# Patient Record
Sex: MALE
[De-identification: names, ages, dates, MRNs, and addresses within clinical notes are randomized per-mention and may not be internally consistent; named-entity substitution may affect disease eponyms.]

---

## 2017-03-20 ENCOUNTER — APPOINTMENT (OUTPATIENT)
Dept: ENDOCRINOLOGY | Facility: CLINIC | Age: 57
End: 2017-03-20

## 2017-03-20 VITALS
HEART RATE: 71 BPM | SYSTOLIC BLOOD PRESSURE: 132 MMHG | BODY MASS INDEX: 33.18 KG/M2 | WEIGHT: 204 LBS | OXYGEN SATURATION: 97 % | DIASTOLIC BLOOD PRESSURE: 90 MMHG | HEIGHT: 65.75 IN

## 2017-03-20 DIAGNOSIS — Z83.49 FAMILY HISTORY OF OTHER ENDOCRINE, NUTRITIONAL AND METABOLIC DISEASES: ICD-10-CM

## 2017-03-20 DIAGNOSIS — Z78.9 OTHER SPECIFIED HEALTH STATUS: ICD-10-CM

## 2017-03-20 DIAGNOSIS — Z00.00 ENCOUNTER FOR GENERAL ADULT MEDICAL EXAMINATION W/OUT ABNORMAL FINDINGS: ICD-10-CM

## 2017-03-20 LAB
GLUCOSE BLDC GLUCOMTR-MCNC: 269
HBA1C MFR BLD HPLC: 10.7

## 2017-03-20 RX ORDER — ISOPROPYL ALCOHOL 70 ML/100ML
SWAB TOPICAL
Qty: 100 | Refills: 5 | Status: ACTIVE | COMMUNITY
Start: 2017-03-20 | End: 1900-01-01

## 2017-03-22 LAB
25(OH)D3 SERPL-MCNC: 15.1 NG/ML
ANION GAP SERPL CALC-SCNC: 19 MMOL/L
BUN SERPL-MCNC: 23 MG/DL
CALCIUM SERPL-MCNC: 9.6 MG/DL
CHLORIDE SERPL-SCNC: 94 MMOL/L
CHOLEST SERPL-MCNC: 275 MG/DL
CHOLEST/HDLC SERPL: 8.9 RATIO
CO2 SERPL-SCNC: 22 MMOL/L
CREAT SERPL-MCNC: 0.88 MG/DL
CREAT SPEC-SCNC: 128 MG/DL
GLUCOSE SERPL-MCNC: 253 MG/DL
HBA1C MFR BLD HPLC: 10.6 %
HDLC SERPL-MCNC: 31 MG/DL
LDLC SERPL CALC-MCNC: NORMAL MG/DL
MICROALBUMIN 24H UR DL<=1MG/L-MCNC: 6.1 MG/DL
MICROALBUMIN/CREAT 24H UR-RTO: 48 UG/MG
POTASSIUM SERPL-SCNC: 4.2 MMOL/L
SODIUM SERPL-SCNC: 135 MMOL/L
TRIGL SERPL-MCNC: 699 MG/DL
TSH SERPL-ACNC: 2.6 UIU/ML

## 2017-05-02 ENCOUNTER — APPOINTMENT (OUTPATIENT)
Dept: ENDOCRINOLOGY | Facility: CLINIC | Age: 57
End: 2017-05-02

## 2017-07-10 ENCOUNTER — APPOINTMENT (OUTPATIENT)
Dept: ENDOCRINOLOGY | Facility: CLINIC | Age: 57
End: 2017-07-10

## 2017-07-19 ENCOUNTER — RX RENEWAL (OUTPATIENT)
Age: 57
End: 2017-07-19

## 2017-11-03 ENCOUNTER — RX RENEWAL (OUTPATIENT)
Age: 57
End: 2017-11-03

## 2018-04-03 ENCOUNTER — RX RENEWAL (OUTPATIENT)
Age: 58
End: 2018-04-03

## 2018-07-05 ENCOUNTER — RX RENEWAL (OUTPATIENT)
Age: 58
End: 2018-07-05

## 2018-07-20 ENCOUNTER — RX RENEWAL (OUTPATIENT)
Age: 58
End: 2018-07-20

## 2018-08-13 ENCOUNTER — RX RENEWAL (OUTPATIENT)
Age: 58
End: 2018-08-13

## 2018-08-13 ENCOUNTER — APPOINTMENT (OUTPATIENT)
Dept: ENDOCRINOLOGY | Facility: CLINIC | Age: 58
End: 2018-08-13
Payer: COMMERCIAL

## 2018-08-13 VITALS
WEIGHT: 200 LBS | BODY MASS INDEX: 32.53 KG/M2 | HEART RATE: 67 BPM | SYSTOLIC BLOOD PRESSURE: 120 MMHG | HEIGHT: 65.75 IN | DIASTOLIC BLOOD PRESSURE: 80 MMHG | OXYGEN SATURATION: 97 %

## 2018-08-13 LAB
GLUCOSE BLDC GLUCOMTR-MCNC: 221
HBA1C MFR BLD HPLC: 10.8

## 2018-08-13 PROCEDURE — 82962 GLUCOSE BLOOD TEST: CPT

## 2018-08-13 PROCEDURE — 99215 OFFICE O/P EST HI 40 MIN: CPT | Mod: 25

## 2018-08-13 PROCEDURE — 83036 HEMOGLOBIN GLYCOSYLATED A1C: CPT | Mod: QW

## 2018-08-14 ENCOUNTER — RESULT REVIEW (OUTPATIENT)
Age: 58
End: 2018-08-14

## 2018-08-14 LAB
25(OH)D3 SERPL-MCNC: 26.6 NG/ML
ALBUMIN SERPL ELPH-MCNC: 4.5 G/DL
ALP BLD-CCNC: 124 U/L
ALT SERPL-CCNC: 29 U/L
ANION GAP SERPL CALC-SCNC: 18 MMOL/L
AST SERPL-CCNC: 17 U/L
BILIRUB SERPL-MCNC: 0.4 MG/DL
BUN SERPL-MCNC: 16 MG/DL
CALCIUM SERPL-MCNC: 9.6 MG/DL
CHLORIDE SERPL-SCNC: 95 MMOL/L
CHOLEST SERPL-MCNC: 141 MG/DL
CHOLEST/HDLC SERPL: 3.8 RATIO
CO2 SERPL-SCNC: 25 MMOL/L
CREAT SERPL-MCNC: 0.8 MG/DL
CREAT SPEC-SCNC: 97 MG/DL
GLUCOSE SERPL-MCNC: 225 MG/DL
HDLC SERPL-MCNC: 37 MG/DL
LDLC SERPL CALC-MCNC: 47 MG/DL
MICROALBUMIN 24H UR DL<=1MG/L-MCNC: 3.8 MG/DL
MICROALBUMIN/CREAT 24H UR-RTO: 39 MG/G
POTASSIUM SERPL-SCNC: 4.7 MMOL/L
PROT SERPL-MCNC: 7.1 G/DL
SODIUM SERPL-SCNC: 138 MMOL/L
TRIGL SERPL-MCNC: 286 MG/DL

## 2018-08-14 RX ORDER — ERGOCALCIFEROL 1.25 MG/1
1.25 MG CAPSULE, LIQUID FILLED ORAL
Qty: 12 | Refills: 0 | Status: DISCONTINUED | COMMUNITY
Start: 2017-03-21 | End: 2018-08-14

## 2018-09-05 ENCOUNTER — RX RENEWAL (OUTPATIENT)
Age: 58
End: 2018-09-05

## 2018-11-19 ENCOUNTER — APPOINTMENT (OUTPATIENT)
Dept: ENDOCRINOLOGY | Facility: CLINIC | Age: 58
End: 2018-11-19

## 2019-01-05 ENCOUNTER — RX RENEWAL (OUTPATIENT)
Age: 59
End: 2019-01-05

## 2019-03-25 ENCOUNTER — RX RENEWAL (OUTPATIENT)
Age: 59
End: 2019-03-25

## 2019-06-14 ENCOUNTER — RX RENEWAL (OUTPATIENT)
Age: 59
End: 2019-06-14

## 2019-07-26 ENCOUNTER — RX RENEWAL (OUTPATIENT)
Age: 59
End: 2019-07-26

## 2019-08-20 ENCOUNTER — RX CHANGE (OUTPATIENT)
Age: 59
End: 2019-08-20

## 2019-08-29 ENCOUNTER — RX RENEWAL (OUTPATIENT)
Age: 59
End: 2019-08-29

## 2020-01-24 ENCOUNTER — RX RENEWAL (OUTPATIENT)
Age: 60
End: 2020-01-24

## 2020-02-24 ENCOUNTER — RX RENEWAL (OUTPATIENT)
Age: 60
End: 2020-02-24

## 2020-08-07 ENCOUNTER — RX RENEWAL (OUTPATIENT)
Age: 60
End: 2020-08-07

## 2020-10-14 ENCOUNTER — RX RENEWAL (OUTPATIENT)
Age: 60
End: 2020-10-14

## 2021-01-05 ENCOUNTER — APPOINTMENT (OUTPATIENT)
Dept: ENDOCRINOLOGY | Facility: CLINIC | Age: 61
End: 2021-01-05

## 2021-04-12 ENCOUNTER — RX RENEWAL (OUTPATIENT)
Age: 61
End: 2021-04-12

## 2021-10-18 ENCOUNTER — RX RENEWAL (OUTPATIENT)
Age: 61
End: 2021-10-18

## 2021-10-20 ENCOUNTER — RX RENEWAL (OUTPATIENT)
Age: 61
End: 2021-10-20

## 2021-11-01 ENCOUNTER — RX RENEWAL (OUTPATIENT)
Age: 61
End: 2021-11-01

## 2021-12-28 ENCOUNTER — RX RENEWAL (OUTPATIENT)
Age: 61
End: 2021-12-28

## 2022-03-23 ENCOUNTER — RX RENEWAL (OUTPATIENT)
Age: 62
End: 2022-03-23

## 2022-06-28 ENCOUNTER — RX RENEWAL (OUTPATIENT)
Age: 62
End: 2022-06-28

## 2022-07-13 ENCOUNTER — APPOINTMENT (OUTPATIENT)
Dept: ENDOCRINOLOGY | Facility: CLINIC | Age: 62
End: 2022-07-13

## 2022-07-13 VITALS — BODY MASS INDEX: 29.03 KG/M2 | WEIGHT: 185 LBS | HEIGHT: 67 IN

## 2022-07-13 DIAGNOSIS — E11.9 TYPE 2 DIABETES MELLITUS W/OUT COMPLICATIONS: ICD-10-CM

## 2022-07-13 LAB — GLUCOSE BLDC GLUCOMTR-MCNC: 243

## 2022-07-13 PROCEDURE — 82962 GLUCOSE BLOOD TEST: CPT

## 2022-07-13 PROCEDURE — G0108 DIAB MANAGE TRN  PER INDIV: CPT

## 2022-07-13 RX ORDER — BLOOD SUGAR DIAGNOSTIC
STRIP MISCELLANEOUS
Qty: 50 | Refills: 5 | Status: ACTIVE | COMMUNITY
Start: 2017-03-20 | End: 1900-01-01

## 2022-08-23 ENCOUNTER — NON-APPOINTMENT (OUTPATIENT)
Age: 62
End: 2022-08-23

## 2022-09-13 ENCOUNTER — RX RENEWAL (OUTPATIENT)
Age: 62
End: 2022-09-13

## 2022-09-13 RX ORDER — LANCETS
EACH MISCELLANEOUS
Qty: 200 | Refills: 3 | Status: ACTIVE | COMMUNITY
Start: 2017-03-20 | End: 1900-01-01

## 2022-09-20 ENCOUNTER — RX RENEWAL (OUTPATIENT)
Age: 62
End: 2022-09-20

## 2022-09-23 ENCOUNTER — APPOINTMENT (OUTPATIENT)
Dept: ENDOCRINOLOGY | Facility: CLINIC | Age: 62
End: 2022-09-23

## 2022-09-23 VITALS
HEART RATE: 87 BPM | DIASTOLIC BLOOD PRESSURE: 84 MMHG | BODY MASS INDEX: 29.98 KG/M2 | WEIGHT: 191 LBS | SYSTOLIC BLOOD PRESSURE: 140 MMHG | OXYGEN SATURATION: 97 % | HEIGHT: 67 IN | TEMPERATURE: 98.1 F

## 2022-09-23 DIAGNOSIS — E11.65 TYPE 2 DIABETES MELLITUS WITH HYPERGLYCEMIA: ICD-10-CM

## 2022-09-23 DIAGNOSIS — I10 ESSENTIAL (PRIMARY) HYPERTENSION: ICD-10-CM

## 2022-09-23 DIAGNOSIS — E78.5 HYPERLIPIDEMIA, UNSPECIFIED: ICD-10-CM

## 2022-09-23 DIAGNOSIS — R79.89 OTHER SPECIFIED ABNORMAL FINDINGS OF BLOOD CHEMISTRY: ICD-10-CM

## 2022-09-23 LAB
GLUCOSE BLDC GLUCOMTR-MCNC: 172
HBA1C MFR BLD HPLC: 7.9

## 2022-09-23 PROCEDURE — 83036 HEMOGLOBIN GLYCOSYLATED A1C: CPT | Mod: QW

## 2022-09-23 PROCEDURE — 82962 GLUCOSE BLOOD TEST: CPT

## 2022-09-23 PROCEDURE — 99215 OFFICE O/P EST HI 40 MIN: CPT | Mod: 25

## 2022-09-23 RX ORDER — MULTIVIT-MIN/FOLIC/VIT K/LYCOP 400-300MCG
25 MCG TABLET ORAL DAILY
Qty: 90 | Refills: 3 | Status: ACTIVE | COMMUNITY
Start: 2018-08-14 | End: 1900-01-01

## 2022-09-23 NOTE — ASSESSMENT
[Diabetes Foot Care] : diabetes foot care [Importance of Diet and Exercise] : importance of diet and exercise to improve glycemic control, achieve weight loss and improve cardiovascular health [Exercise/Effect on Glucose] : exercise/effect on glucose [Hypoglycemia Management] : hypoglycemia management [Self Monitoring of Blood Glucose] : self monitoring of blood glucose [FreeTextEntry1] : Patient is a 62 M with HTN, HLD, T2D, vitamin D deficiency here for follow up. \par  \par # T2DM:\par - HgB A1C: 7.9, goal 7\par - Complications:  microalbuminuria \par - Aspirin: no\par - Most recent urine microalbumin 72      . ACE-I/ARB: not taking consistently \par - Most recent LDL:  73  . On statin: yes \par - Opthalmology up to date: yes ,  advised for yearly check up\par - Podiatry up to date:  no advised for yearly check up\par - Patient to call for persistent glucose < 70 or > 300\par - Patient counseled on the importance of consistent carbohydrate diet and regular physical activity \par - Advised patient to continue checking FSG and to bring meter to all visits \par - Symptoms of hypoglycemia discussed\par - Medications changes: \par - Patient's A1C not at goal and based on scanned glucose log, fasting and prandial elevated. I recommended the patient to switch from DPP4i to GLP1RA. Patient is hesitant at this time. He does not want injections at this time despite reassuring him that the benefits of GLP1RA including cardiovascular benefits and weight loss is substantial compared to DPP4i. Through shared decision making, we decided to hold off on GLP1RA at this time and reassess in 3 months. \par - For now, continue with Metformin 1000 mg BID, Januvia 100 mg daily \par - Increase Glimepride to 2 mg BID before breakfast and before dinner (I counseled the patient on the increased risk of hypoglycemia with skipping meals and higher dose)\par - I counseled the patient on self monitoring of glucose fasting and before bed time as well as hypoglycemia\par  \par 2. HTN\par - BP today 140/84, used to be on lisinopril 2.5 mg daily but not taking it \par - Discussed with the patient that he has microalbuminuia that would benefit from ACEI or ARB\par - Lisinopril 2.5 mg refilled  \par \par 3. HLD\par - Most recent LDL 73\par - Continue with  atorvastatin 40 mg daily \par \par 4. Vitamin D deficiency \par - Continue with vitamin D3 1000 IU daily \par \par FOLLOW UP: I recommend that next visit for diabetes care be in 3 month\par \par To do at next clinic visit\par - If A1C continues to be elevated at next visit, switch from Januvia to GLP1RA\par \par Kim Arauz MD\par Endocrinology, Diabetes and Metabolism\par [address] [facility] [address]\[place] [address]\par Tel [phone]\par Fax [phone]\par \par \par

## 2022-09-23 NOTE — HISTORY OF PRESENT ILLNESS
[FreeTextEntry1] : Patient is a 62 M with HTN, HLD, T2D, vitamin D deficiency here for follow up. \par \par FH: mother has diabetes \par SH: denies smoking, alcohol use\par \par # T2DM:\par Diabetes history:\par Diagnosed 5-6 years ago. Was initially treated with metformin 500 mg BID, but medication ran out and did not have any medical follow up. \par \par Most recent A1C 7.9 2022\par \par Diabetes complications: \par Neuropathy: denies \par Retinopathy: denies, having cataract surgery this month \par Nephropathy: ACR 72 2022 (most recent Cr 0.88, GFR97)\par CAD/MI/CVA: no\par \par Current DM medications: \par - Metformin 500 mg 2 tablets BID\par - Januvia 100 mg daily \par - Glimepiride 2 mg in the morning \par \par Past DM medications: \par - none \par \par Finger sticks: \par Based on prior scanned glucose log \par Fastin, 227, 264, 193, 219, 230, 233, 240, 250, 257, 221\par After lunch 2 hours: 132, 185, 159, 190, 195, 219, 195, 199\par After dinner 2 hours 242, 233, 219, 235, 265, 203\par \par Diet: \par Breakfast: egg, meat, low carb wrap, apple, coffee\par Lunch: vegetables, pork\par Dinner: fish, sushi, cut out rice, vegetables, beefs and chicken\par Snacks: nuts, occasional chips\par Drinks: seltzer, milk  \par Exercise: bike riding, volley ball \par \par # HTN\par - BP today 140/84, used to be on lisinopril 2.5 mg daily but not taking it \par \par # HLD\par - Most recent LDL 73 based on outside blood work\par - Atorvastatin 40 mg daily \par \par  visit events: trying to cut out carbs like rice, pasta\par   \par 2022 blood work done at PCP office \par cholesterol 138\par HDL 39\par \par LDL 73\par ACR 72

## 2022-11-23 ENCOUNTER — RX RENEWAL (OUTPATIENT)
Age: 62
End: 2022-11-23

## 2022-12-29 ENCOUNTER — RX RENEWAL (OUTPATIENT)
Age: 62
End: 2022-12-29

## 2023-02-06 ENCOUNTER — APPOINTMENT (OUTPATIENT)
Dept: ENDOCRINOLOGY | Facility: CLINIC | Age: 63
End: 2023-02-06

## 2023-03-10 ENCOUNTER — APPOINTMENT (OUTPATIENT)
Dept: ENDOCRINOLOGY | Facility: CLINIC | Age: 63
End: 2023-03-10

## 2023-06-30 ENCOUNTER — APPOINTMENT (OUTPATIENT)
Dept: ENDOCRINOLOGY | Facility: CLINIC | Age: 63
End: 2023-06-30

## 2023-10-01 ENCOUNTER — RX RENEWAL (OUTPATIENT)
Age: 63
End: 2023-10-01

## 2023-10-01 RX ORDER — LISINOPRIL 2.5 MG/1
2.5 TABLET ORAL
Qty: 90 | Refills: 3 | Status: ACTIVE | COMMUNITY
Start: 2017-03-21 | End: 1900-01-01

## 2023-10-01 RX ORDER — SITAGLIPTIN 100 MG/1
100 TABLET, FILM COATED ORAL DAILY
Qty: 90 | Refills: 2 | Status: ACTIVE | COMMUNITY
Start: 2017-03-20 | End: 1900-01-01

## 2023-10-01 RX ORDER — ATORVASTATIN CALCIUM 40 MG/1
40 TABLET, FILM COATED ORAL
Qty: 90 | Refills: 3 | Status: ACTIVE | COMMUNITY
Start: 2017-03-21 | End: 1900-01-01

## 2023-10-12 ENCOUNTER — RX RENEWAL (OUTPATIENT)
Age: 63
End: 2023-10-12

## 2023-10-12 RX ORDER — METFORMIN ER 500 MG 500 MG/1
500 TABLET ORAL
Qty: 360 | Refills: 3 | Status: ACTIVE | COMMUNITY
Start: 2017-03-20 | End: 1900-01-01

## 2023-10-20 ENCOUNTER — RX RENEWAL (OUTPATIENT)
Age: 63
End: 2023-10-20

## 2023-10-20 RX ORDER — GLIMEPIRIDE 2 MG/1
2 TABLET ORAL
Qty: 180 | Refills: 3 | Status: ACTIVE | COMMUNITY
Start: 2018-08-13 | End: 1900-01-01

## 2024-07-01 ENCOUNTER — APPOINTMENT (OUTPATIENT)
Dept: ENDOCRINOLOGY | Facility: CLINIC | Age: 64
End: 2024-07-01

## 2024-07-16 ENCOUNTER — RX RENEWAL (OUTPATIENT)
Age: 64
End: 2024-07-16

## 2024-08-30 ENCOUNTER — APPOINTMENT (OUTPATIENT)
Dept: ENDOCRINOLOGY | Facility: CLINIC | Age: 64
End: 2024-08-30
Payer: COMMERCIAL

## 2024-08-30 VITALS
HEIGHT: 67 IN | WEIGHT: 192 LBS | BODY MASS INDEX: 30.13 KG/M2 | HEART RATE: 77 BPM | SYSTOLIC BLOOD PRESSURE: 134 MMHG | DIASTOLIC BLOOD PRESSURE: 80 MMHG | OXYGEN SATURATION: 97 %

## 2024-08-30 DIAGNOSIS — E11.65 TYPE 2 DIABETES MELLITUS WITH HYPERGLYCEMIA: ICD-10-CM

## 2024-08-30 DIAGNOSIS — E66.9 OBESITY, UNSPECIFIED: ICD-10-CM

## 2024-08-30 DIAGNOSIS — I10 ESSENTIAL (PRIMARY) HYPERTENSION: ICD-10-CM

## 2024-08-30 DIAGNOSIS — E78.5 HYPERLIPIDEMIA, UNSPECIFIED: ICD-10-CM

## 2024-08-30 PROCEDURE — 99214 OFFICE O/P EST MOD 30 MIN: CPT

## 2024-08-30 RX ORDER — TIRZEPATIDE 2.5 MG/.5ML
2.5 INJECTION, SOLUTION SUBCUTANEOUS
Qty: 3 | Refills: 3 | Status: ACTIVE | COMMUNITY
Start: 2024-08-30 | End: 1900-01-01

## 2024-08-30 NOTE — ASSESSMENT
[Diabetes Foot Care] : diabetes foot care [Importance of Diet and Exercise] : importance of diet and exercise to improve glycemic control, achieve weight loss and improve cardiovascular health [Exercise/Effect on Glucose] : exercise/effect on glucose [Hypoglycemia Management] : hypoglycemia management [Self Monitoring of Blood Glucose] : self monitoring of blood glucose [FreeTextEntry1] : Patient is a 64 M with HTN, HLD, T2D, vitamin D deficiency here for follow up.    # T2DM: - HgB A1C: 7.9 --> 9.3 8/30/2024 - Complications:  microalbuminuria  - Aspirin: no - Most recent urine microalbumin 72  repeat today     . ACE-I/ARB: not taking consistently  - Most recent LDL: 93  . On statin: yes  - Opthalmology up to date: yes ,  advised for yearly check up - Podiatry up to date:  no advised for yearly check up - Patient to call for persistent glucose < 70 or > 300 - Patient counseled on the importance of consistent carbohydrate diet and regular physical activity  - Advised patient to continue checking FSG and to bring meter to all visits  - Symptoms of hypoglycemia discussed - Medications changes:  - Patient's A1C not at goal. Patient has no glucose data. I recommended the patient to switch from DPP4i to GLP1RA. Patient is agreeable at this time  - For now, continue with Metformin 1000 mg BID  - Continue with Glimepride 2 mg BID before breakfast and before dinner (I counseled the patient on the increased risk of hypoglycemia with skipping meals and higher dose) - Start mounjaro 2.5 mg weekly. For GLP1A, I discussed potential benefits for patients with clinical ASCVD as well as reduced the risk of cardiovascular mortality.  I discussed side effects of the GLP-1 agonist including pancreatitis, nausea or vomiting, injection site reactions and benefits including potential for weight loss and glycemic benefits. Patient confirmed that they have no history of pancreatitis and no family history of  thyroid cancer.  - I counseled the patient on self monitoring of glucose fasting and before bed time as well as hypoglycemia   # HTN - BP today 134/80  - Discussed with the patient that he has microalbuminuia that would benefit from ACEI or ARB - Continue with Lisinopril 2.5 mg  - Check ACR today   # HLD - Most recent LDL 94, not at goal  - Currently on atorvastatin 40 mg daily, increase to 80 mg daily   # Class 1 obesity  -BMI 30  - Start glp1ra as above   # Vitamin D deficiency  - Continue with vitamin D3 1000 IU daily   FOLLOW UP: I recommend that next visit for diabetes care be in 3 months with CP   To do at next clinic visit - titrate up GLP1RA   Kim Arauz MD Endocrinology, Diabetes and Metabolism [address] [place] [address] Tel [phone] Fax [phone]

## 2024-08-30 NOTE — HISTORY OF PRESENT ILLNESS
[FreeTextEntry1] : Patient is a 64 M with HTN, HLD, T2D, vitamin D deficiency here for follow up. Last seen me 09/2022  FH: mother has diabetes  SH: denies smoking, alcohol use  # T2DM: Diabetes history: Diagnosed 5-6 years ago. Was initially treated with metformin 500 mg BID, but medication ran out and did not have any medical follow up.   Most recent A1C 7.9 09/23/2022--> 9.3 8/30/2024  Diabetes complications:  Neuropathy: denies  Retinopathy: denies, having cataract surgery this month  Nephropathy: ACR 72 08/2022 (most recent Cr 0.88, GFR97) CAD/MI/CVA: no  Current DM medications:  - Metformin 500 mg 2 tablets BID - Januvia 100 mg daily  - Glimepiride 2 mg BID  Past DM medications:  - none   Finger sticks:  Has not been checking at home   Diet:  Breakfast: egg, meat, low carb wrap, apple, coffee Lunch: vegetables, pork Dinner: fish, sushi, cut out rice, vegetables, beefs and chicken Snacks: nuts, occasional chips Drinks: seltzer, milk   Exercise: bike riding, volley ball   # HTN - BP today 134/80 - On lisinorpril 25. mg daily   # HLD - LDL 94 04/2024 - currently on Atorvastatin 40 mg daily   9/23/2022 visit events: trying to cut out carbs like rice, pasta    08/08/2022 blood work done at PCP office  cholesterol 138 HDL 39  LDL 73 ACR 72  8/30/2024 visit events: overall doing well. Had blood work 3 months ago at PCP office.  04/2024 Cr 0.83 GFR 98 Calcium 10.3  , DHL 39, , LDL 94, non  12 hour fast  TSH 2.44 Vitamin D 25 OH 28

## 2024-08-31 LAB
CREAT SPEC-SCNC: 95 MG/DL
MICROALBUMIN 24H UR DL<=1MG/L-MCNC: 8.4 MG/DL
MICROALBUMIN/CREAT 24H UR-RTO: 89 MG/G

## 2024-10-14 ENCOUNTER — RX RENEWAL (OUTPATIENT)
Age: 64
End: 2024-10-14

## 2024-11-20 ENCOUNTER — APPOINTMENT (OUTPATIENT)
Dept: GASTROENTEROLOGY | Facility: CLINIC | Age: 64
End: 2024-11-20

## 2025-02-24 ENCOUNTER — APPOINTMENT (OUTPATIENT)
Dept: ENDOCRINOLOGY | Facility: CLINIC | Age: 65
End: 2025-02-24